# Patient Record
Sex: MALE | Race: WHITE | HISPANIC OR LATINO | Employment: OTHER | URBAN - METROPOLITAN AREA
[De-identification: names, ages, dates, MRNs, and addresses within clinical notes are randomized per-mention and may not be internally consistent; named-entity substitution may affect disease eponyms.]

---

## 2024-01-11 ENCOUNTER — OFFICE VISIT (OUTPATIENT)
Dept: FAMILY MEDICINE CLINIC | Facility: CLINIC | Age: 34
End: 2024-01-11
Payer: COMMERCIAL

## 2024-01-11 VITALS
DIASTOLIC BLOOD PRESSURE: 72 MMHG | BODY MASS INDEX: 29.96 KG/M2 | OXYGEN SATURATION: 97 % | HEIGHT: 71 IN | SYSTOLIC BLOOD PRESSURE: 122 MMHG | WEIGHT: 214 LBS | HEART RATE: 77 BPM | RESPIRATION RATE: 18 BRPM

## 2024-01-11 DIAGNOSIS — Z11.59 NEED FOR HEPATITIS C SCREENING TEST: ICD-10-CM

## 2024-01-11 DIAGNOSIS — Z13.228 ENCOUNTER FOR SCREENING FOR OTHER METABOLIC DISORDERS: ICD-10-CM

## 2024-01-11 DIAGNOSIS — Z13.220 SCREENING FOR HYPERLIPIDEMIA: ICD-10-CM

## 2024-01-11 DIAGNOSIS — L05.91 PILONIDAL CYST: ICD-10-CM

## 2024-01-11 DIAGNOSIS — Z00.00 ANNUAL PHYSICAL EXAM: Primary | ICD-10-CM

## 2024-01-11 LAB
ALBUMIN SERPL-MCNC: 4.2 G/DL (ref 3.5–5.2)
ALBUMIN/GLOB SERPL: 1.6 G/DL
ALP SERPL-CCNC: 57 U/L (ref 39–117)
ALT SERPL-CCNC: 31 U/L (ref 1–41)
AST SERPL-CCNC: 25 U/L (ref 1–40)
BASOPHILS # BLD AUTO: 0.03 10*3/MM3 (ref 0–0.2)
BASOPHILS NFR BLD AUTO: 0.3 % (ref 0–1.5)
BILIRUB SERPL-MCNC: 0.3 MG/DL (ref 0–1.2)
BUN SERPL-MCNC: 17 MG/DL (ref 6–20)
BUN/CREAT SERPL: 15.5 (ref 7–25)
CALCIUM SERPL-MCNC: 9.5 MG/DL (ref 8.6–10.5)
CHLORIDE SERPL-SCNC: 105 MMOL/L (ref 98–107)
CHOLEST SERPL-MCNC: 178 MG/DL (ref 0–200)
CHOLEST/HDLC SERPL: 3.56 {RATIO}
CO2 SERPL-SCNC: 27 MMOL/L (ref 22–29)
CREAT SERPL-MCNC: 1.1 MG/DL (ref 0.76–1.27)
EGFRCR SERPLBLD CKD-EPI 2021: 90.9 ML/MIN/1.73
EOSINOPHIL # BLD AUTO: 0.21 10*3/MM3 (ref 0–0.4)
EOSINOPHIL NFR BLD AUTO: 2.3 % (ref 0.3–6.2)
ERYTHROCYTE [DISTWIDTH] IN BLOOD BY AUTOMATED COUNT: 12.5 % (ref 12.3–15.4)
GLOBULIN SER CALC-MCNC: 2.6 GM/DL
GLUCOSE SERPL-MCNC: 101 MG/DL (ref 65–99)
HCT VFR BLD AUTO: 45.8 % (ref 37.5–51)
HDLC SERPL-MCNC: 50 MG/DL (ref 40–60)
HGB BLD-MCNC: 14.8 G/DL (ref 13–17.7)
IMM GRANULOCYTES # BLD AUTO: 0.05 10*3/MM3 (ref 0–0.05)
IMM GRANULOCYTES NFR BLD AUTO: 0.5 % (ref 0–0.5)
LDLC SERPL CALC-MCNC: 111 MG/DL (ref 0–100)
LYMPHOCYTES # BLD AUTO: 2.8 10*3/MM3 (ref 0.7–3.1)
LYMPHOCYTES NFR BLD AUTO: 30.6 % (ref 19.6–45.3)
MCH RBC QN AUTO: 27.3 PG (ref 26.6–33)
MCHC RBC AUTO-ENTMCNC: 32.3 G/DL (ref 31.5–35.7)
MCV RBC AUTO: 84.5 FL (ref 79–97)
MONOCYTES # BLD AUTO: 0.85 10*3/MM3 (ref 0.1–0.9)
MONOCYTES NFR BLD AUTO: 9.3 % (ref 5–12)
NEUTROPHILS # BLD AUTO: 5.2 10*3/MM3 (ref 1.7–7)
NEUTROPHILS NFR BLD AUTO: 57 % (ref 42.7–76)
NRBC BLD AUTO-RTO: 0 /100 WBC (ref 0–0.2)
PLATELET # BLD AUTO: 254 10*3/MM3 (ref 140–450)
POTASSIUM SERPL-SCNC: 4.5 MMOL/L (ref 3.5–5.2)
PROT SERPL-MCNC: 6.8 G/DL (ref 6–8.5)
RBC # BLD AUTO: 5.42 10*6/MM3 (ref 4.14–5.8)
SODIUM SERPL-SCNC: 141 MMOL/L (ref 136–145)
TRIGL SERPL-MCNC: 93 MG/DL (ref 0–150)
VLDLC SERPL CALC-MCNC: 17 MG/DL (ref 5–40)
WBC # BLD AUTO: 9.14 10*3/MM3 (ref 3.4–10.8)

## 2024-01-11 NOTE — PATIENT INSTRUCTIONS
I will call call or send [employer] message with your lab results.   Please call with any questions or concerns.    Return in about 1 year (around 1/11/2025) for Annual, Labs.

## 2024-01-11 NOTE — PROGRESS NOTES
Preventive Exam    History of Present Illness: Max Swan is a 33 y.o. here for check up and review of routine health maintenance. he states he is doing well and has the below concerns:     Patient has pilonidal cyst that recurred about 1 week ago. He reports that he had surgery on this about 10 years ago. States that site is painful and last night erupted with drainage that had pus and blood. He denies any fever     Past medical history, surgical history and family history have been reviewed.     Review of Systems   Constitutional:  Negative for appetite change, chills, fatigue, fever, unexpected weight gain and unexpected weight loss.   HENT:  Negative for congestion, dental problem, hearing loss, mouth sores, postnasal drip, sinus pressure and sore throat.         Dental exam is due.    Eyes: Negative.  Negative for blurred vision, double vision, photophobia and visual disturbance.        Eye exam is due.    Respiratory:  Negative for cough, chest tightness, shortness of breath and wheezing.    Cardiovascular:  Negative for chest pain, palpitations and leg swelling.   Gastrointestinal:  Negative for abdominal pain, constipation, diarrhea, nausea and vomiting.   Endocrine: Negative.  Negative for cold intolerance and heat intolerance.   Genitourinary: Negative.  Negative for decreased urine volume, difficulty urinating, discharge, dysuria, frequency, penile pain, penile swelling, scrotal swelling and urgency.   Musculoskeletal:  Negative for arthralgias, back pain, joint swelling and myalgias.   Skin:  Positive for dry skin.        Fungal infection of finger nails and toe nails   Allergic/Immunologic: Negative.  Negative for environmental allergies and food allergies.   Neurological:  Negative for dizziness, weakness, numbness and headache.   Hematological: Negative.  Does not bruise/bleed easily.   Psychiatric/Behavioral: Negative.  Negative for dysphoric mood, sleep disturbance, suicidal ideas and depressed  mood. The patient is not nervous/anxious.        PHYSICAL EXAM    Vitals:    01/11/24 0819   BP: 122/72   Pulse: 77   Resp: 18   SpO2: 97%       Body mass index is 29.85 kg/m².   BMI is >= 25 and <30. (Overweight) The following options were offered after discussion;: exercise counseling/recommendations and nutrition counseling/recommendations       Physical Exam  Vitals and nursing note reviewed.   Constitutional:       Appearance: Normal appearance. He is well-developed and overweight.   HENT:      Head: Normocephalic and atraumatic.      Right Ear: Tympanic membrane, ear canal and external ear normal.      Left Ear: Tympanic membrane, ear canal and external ear normal.      Nose: Nose normal.      Mouth/Throat:      Lips: Pink.      Mouth: Mucous membranes are moist.      Tongue: No lesions.      Palate: No mass and lesions.      Pharynx: Oropharynx is clear. Uvula midline.      Tonsils: No tonsillar exudate.   Eyes:      Conjunctiva/sclera: Conjunctivae normal.      Pupils: Pupils are equal, round, and reactive to light.   Neck:      Thyroid: No thyromegaly.   Cardiovascular:      Rate and Rhythm: Normal rate and regular rhythm.      Pulses: Normal pulses.           Dorsalis pedis pulses are 2+ on the right side and 2+ on the left side.        Posterior tibial pulses are 2+ on the right side and 2+ on the left side.      Heart sounds: Normal heart sounds. No murmur heard.  Pulmonary:      Effort: Pulmonary effort is normal.      Breath sounds: Normal breath sounds.   Abdominal:      General: Bowel sounds are normal. There is no distension.      Palpations: Abdomen is soft.      Tenderness: There is no abdominal tenderness.   Musculoskeletal:         General: No deformity. Normal range of motion.      Cervical back: Normal range of motion and neck supple.      Right lower leg: No edema.      Left lower leg: No edema.   Lymphadenopathy:      Head:      Right side of head: No submental, submandibular, tonsillar,  preauricular, posterior auricular or occipital adenopathy.      Left side of head: No submental, submandibular, tonsillar, preauricular, posterior auricular or occipital adenopathy.      Cervical: No cervical adenopathy.      Right cervical: No superficial, deep or posterior cervical adenopathy.     Left cervical: No superficial, deep or posterior cervical adenopathy.      Upper Body:      Right upper body: No supraclavicular adenopathy.      Left upper body: No supraclavicular adenopathy.   Skin:     General: Skin is warm and dry.      Capillary Refill: Capillary refill takes 2 to 3 seconds.             Comments: Small pencil eraser size open wound with blood drainage noted.    Neurological:      General: No focal deficit present.      Mental Status: He is alert and oriented to person, place, and time.      Cranial Nerves: No cranial nerve deficit.      Sensory: Sensation is intact.      Motor: Motor function is intact.      Coordination: Coordination is intact.      Gait: Gait is intact.   Psychiatric:         Attention and Perception: Attention and perception normal.         Mood and Affect: Mood and affect normal.         Speech: Speech normal.         Behavior: Behavior normal. Behavior is cooperative.         Thought Content: Thought content normal.         Cognition and Memory: Cognition and memory normal.         Judgment: Judgment normal.         Procedures    Diagnoses and all orders for this visit:    1. Annual physical exam (Primary)    2. Encounter for screening for other metabolic disorders  -     CBC & Differential  -     Comprehensive Metabolic Panel    3. Screening for hyperlipidemia  -     Lipid Panel With / Chol / HDL Ratio    4. Pilonidal cyst  -     Ambulatory Referral to General Surgery    5. Need for hepatitis C screening test  -     Hepatitis C Antibody        Problems Addressed this Visit    None  Visit Diagnoses       Annual physical exam    -  Primary    Encounter for screening for other  metabolic disorders        Relevant Orders    CBC & Differential    Comprehensive Metabolic Panel    Screening for hyperlipidemia        Relevant Orders    Lipid Panel With / Chol / HDL Ratio    Pilonidal cyst        Relevant Orders    Ambulatory Referral to General Surgery    Need for hepatitis C screening test        Relevant Orders    Hepatitis C Antibody          Diagnoses         Codes Comments    Annual physical exam    -  Primary ICD-10-CM: Z00.00  ICD-9-CM: V70.0     Encounter for screening for other metabolic disorders     ICD-10-CM: Z13.228  ICD-9-CM: V77.99     Screening for hyperlipidemia     ICD-10-CM: Z13.220  ICD-9-CM: V77.91     Pilonidal cyst     ICD-10-CM: L05.91  ICD-9-CM: 685.1     Need for hepatitis C screening test     ICD-10-CM: Z11.59  ICD-9-CM: V73.89           Lipid panel  CMP  CBC  Hepatitis C Screening  Referral to general surgery    Routine health maintenance reviewed and discussed with Max Swan.    Preventative counseling regarding healthy diet and exercise.   Pt reports that he wears a seatbelt regularly.   Return in about 1 year (around 1/11/2025) for Annual, Labs.

## 2024-01-12 LAB — HCV IGG SERPL QL IA: NON REACTIVE

## 2024-02-26 ENCOUNTER — OFFICE VISIT (OUTPATIENT)
Dept: SURGERY | Facility: CLINIC | Age: 34
End: 2024-02-26
Payer: COMMERCIAL

## 2024-02-26 VITALS
HEIGHT: 71 IN | SYSTOLIC BLOOD PRESSURE: 116 MMHG | BODY MASS INDEX: 29.26 KG/M2 | DIASTOLIC BLOOD PRESSURE: 76 MMHG | WEIGHT: 209 LBS

## 2024-02-26 DIAGNOSIS — L05.91 PILONIDAL CYST: Primary | ICD-10-CM

## 2024-02-26 PROCEDURE — 99203 OFFICE O/P NEW LOW 30 MIN: CPT | Performed by: SURGERY

## 2024-02-26 RX ORDER — AMOXICILLIN AND CLAVULANATE POTASSIUM 500; 125 MG/1; MG/1
1 TABLET, FILM COATED ORAL 3 TIMES DAILY
Qty: 10 TABLET | Refills: 0 | Status: SHIPPED | OUTPATIENT
Start: 2024-02-26

## 2024-02-26 NOTE — PROGRESS NOTES
Cc: Pilonidal cyst    History of presenting illness:   This is a quite nice, generally healthy 33-year-old gentleman with a history of pilonidal cyst going back to his late teen years.  He did not have much trouble with it until 10 years ago and at that time he had what sounds like an incision and drainage.  Really in the interim over the last 10 years she has had minimal trouble from this until about a month or so ago when he began having some swelling, eventually came to what sound like a point and then began to drain spontaneously just above into the left of the superior gluteal cleft.  He still feels some sense of pressure but overall it is improved.  There is minimal drainage currently.    Past Medical History: Denies    Past Surgical History: Significant only for what sounds like incision and drainage of a pilonidal cyst    Medications: None chronically    Allergies: Aspirin caused swelling    Social History: He is a non-smoker, he is active    Family History: Significant for diabetes, negative for known colon or rectal cancer    Review of Systems:  Constitutional: Denies fever, chills, change in weight  Neck: no swollen glands or dysphagia or odynophagia  Respiratory: negative for SOB, cough, hemoptysis or wheezing  Cardiovascular: negative for chest pain, palpitations or peripheral edema  Gastrointestinal: Denies nausea, vomiting, abdominal pain      Physical Exam:  BMI: 29.2  General: alert and oriented, appropriate, no acute distress  Eyes: No scleral icterus, extraocular movements are intact  Neck: Supple without lymphadenopathy or thyromegaly, trachea is in the midline  Respiratory: There is good bilateral chest expansion, no use of accessory muscles is noted  Cardiovascular: No jugular venous distention or peripheral edema is seen  Gastrointestinal: Soft and benign without mass or ventral hernia  Rectal: At the superior gluteal cleft, just to the left of this there is a firm area consistent with  pilonidal cyst.  Inferiorly along the midline there are several sinus openings.  There is mild tenderness, no clear fluctuance.  There is a small amount of drainage.    Laboratory data: None    Imaging data: No recent relevant data      Assessment and plan:   -Pilonidal cyst, mild symptoms, really for the first time in about 10 years  -I discussed the options with the patient.  I think given his age (greater than 30) and the relative infrequency of his flareups, I would suggest conservative management for now.  I do think that a trial of antibiotics in order to try to get some of the swelling down would be wise.  Going forward if he has further problems we could treat him with antibiotics and obviously pilonidal cystectomy could be considered, but I think all things equal would be best served with conservative manage for the time being.  He is agreeable with this and will contact us if he is having further problems.      Donell Merritt MD, FACS  General, Minimally Invasive and Endoscopic Surgery  [facility]    [address], [address]  [place], [place], [address]  P: [phone]  F: [phone]

## 2024-04-11 ENCOUNTER — OFFICE VISIT (OUTPATIENT)
Dept: FAMILY MEDICINE CLINIC | Facility: CLINIC | Age: 34
End: 2024-04-11
Payer: COMMERCIAL

## 2024-04-11 VITALS
BODY MASS INDEX: 28.7 KG/M2 | RESPIRATION RATE: 18 BRPM | WEIGHT: 205 LBS | DIASTOLIC BLOOD PRESSURE: 88 MMHG | SYSTOLIC BLOOD PRESSURE: 126 MMHG | HEART RATE: 80 BPM | HEIGHT: 71 IN | OXYGEN SATURATION: 98 %

## 2024-04-11 DIAGNOSIS — F41.9 ANXIETY: ICD-10-CM

## 2024-04-11 DIAGNOSIS — R07.9 CHEST PAIN, UNSPECIFIED TYPE: ICD-10-CM

## 2024-04-11 DIAGNOSIS — Z23 NEED FOR VACCINATION: Primary | ICD-10-CM

## 2024-04-11 NOTE — PROGRESS NOTES
"Samantha Swan is a 33 y.o. male.     History of Present Illness   Patient presents with c/o chest pain X 5 days, in the middle of his chest below collarbone. He reports that the pain is intermittent and feels like when \"someone scares you\".  He reports that he's a little anxious at times.  He denies any headaches, palpitations or shortness of breath. Negative for family history of cardiac disease.     The following portions of the patient's history were reviewed and updated as appropriate: allergies, current medications, past family history, past medical history, past social history, past surgical history and problem list.    Review of Systems   Constitutional:  Negative for chills, fatigue and fever.   Eyes:  Negative for blurred vision and double vision.   Respiratory:  Negative for cough, chest tightness, shortness of breath and wheezing.    Cardiovascular:  Negative for chest pain, palpitations and leg swelling.   Neurological:  Negative for dizziness, weakness and headache.       Objective   Physical Exam  Vitals and nursing note reviewed.   Constitutional:       Appearance: He is well-developed.   HENT:      Head: Normocephalic and atraumatic.   Eyes:      Conjunctiva/sclera: Conjunctivae normal.      Pupils: Pupils are equal, round, and reactive to light.   Neck:      Thyroid: No thyromegaly.   Cardiovascular:      Rate and Rhythm: Normal rate and regular rhythm.      Pulses: Normal pulses.      Heart sounds: Normal heart sounds. No murmur heard.     No friction rub. No gallop.      Comments: Normal cardiac exam  Pulmonary:      Effort: Pulmonary effort is normal.      Breath sounds: Normal breath sounds.   Musculoskeletal:      Cervical back: Normal range of motion and neck supple.      Right lower leg: No edema.      Left lower leg: No edema.   Lymphadenopathy:      Cervical: No cervical adenopathy.   Skin:     General: Skin is warm and dry.      Capillary Refill: Capillary refill takes 2 to 3 " seconds.   Neurological:      Mental Status: He is alert and oriented to person, place, and time.      Cranial Nerves: No cranial nerve deficit.   Psychiatric:         Behavior: Behavior normal.         Thought Content: Thought content normal.         Judgment: Judgment normal.         Vitals:    04/11/24 1129   BP: 126/88   Pulse: 80   Resp: 18   SpO2: 98%     Body mass index is 28.59 kg/m².        ECG 12 Lead    Date/Time: 4/11/2024 1:19 PM  Performed by: Sirisha Light APRN    Authorized by: Sirisha Light APRN  Comparison: not compared with previous ECG   Previous ECG: no previous ECG available  Rhythm: sinus rhythm  Rate: normal  BPM: 70    Clinical impression: normal ECG          Assessment & Plan   Problems Addressed this Visit    None  Visit Diagnoses       Need for vaccination    -  Primary    Relevant Orders    COVID-19 F23 (Pfizer) 12yrs+ (COMIRNATY) (Completed)    Tdap Vaccine => 8yo IM (BOOSTRIX) (Completed)    Chest pain, unspecified type        Relevant Orders    ECG 12 Lead    Comprehensive Metabolic Panel    CBC (No Diff)    Anxiety              Diagnoses         Codes Comments    Need for vaccination    -  Primary ICD-10-CM: Z23  ICD-9-CM: V05.9     Chest pain, unspecified type     ICD-10-CM: R07.9  ICD-9-CM: 786.50     Anxiety     ICD-10-CM: F41.9  ICD-9-CM: 300.00           EKG was normal  Discussed anxiety and encouraged exercise  Patient education regarding anxiety included in AVS  CBC  CMP           Return if symptoms worsen or fail to improve.  Answers submitted by the patient for this visit:  Primary Reason for Visit (Submitted on 4/10/2024)  What is the primary reason for your visit?: Other  Other (Submitted on 4/10/2024)  Please describe your symptoms.: Chest Pain  Have you had these symptoms before?: No  How long have you been having these symptoms?: 1-4 days  Please list any medications you are currently taking for this condition.: None  Please describe any probable cause for  these symptoms. : Stress/Anxiety possibly

## 2024-04-12 LAB
ALBUMIN SERPL-MCNC: 4.4 G/DL (ref 3.5–5.2)
ALBUMIN/GLOB SERPL: 1.6 G/DL
ALP SERPL-CCNC: 57 U/L (ref 39–117)
ALT SERPL-CCNC: 50 U/L (ref 1–41)
AST SERPL-CCNC: 22 U/L (ref 1–40)
BILIRUB SERPL-MCNC: 0.4 MG/DL (ref 0–1.2)
BUN SERPL-MCNC: 15 MG/DL (ref 6–20)
BUN/CREAT SERPL: 11.5 (ref 7–25)
CALCIUM SERPL-MCNC: 9.5 MG/DL (ref 8.6–10.5)
CHLORIDE SERPL-SCNC: 104 MMOL/L (ref 98–107)
CO2 SERPL-SCNC: 26.3 MMOL/L (ref 22–29)
CREAT SERPL-MCNC: 1.31 MG/DL (ref 0.76–1.27)
EGFRCR SERPLBLD CKD-EPI 2021: 73.7 ML/MIN/1.73
ERYTHROCYTE [DISTWIDTH] IN BLOOD BY AUTOMATED COUNT: 13 % (ref 12.3–15.4)
GLOBULIN SER CALC-MCNC: 2.8 GM/DL
GLUCOSE SERPL-MCNC: 111 MG/DL (ref 65–99)
HCT VFR BLD AUTO: 46.5 % (ref 37.5–51)
HGB BLD-MCNC: 15.2 G/DL (ref 13–17.7)
MCH RBC QN AUTO: 27.5 PG (ref 26.6–33)
MCHC RBC AUTO-ENTMCNC: 32.7 G/DL (ref 31.5–35.7)
MCV RBC AUTO: 84.1 FL (ref 79–97)
PLATELET # BLD AUTO: 253 10*3/MM3 (ref 140–450)
POTASSIUM SERPL-SCNC: 4.7 MMOL/L (ref 3.5–5.2)
PROT SERPL-MCNC: 7.2 G/DL (ref 6–8.5)
RBC # BLD AUTO: 5.53 10*6/MM3 (ref 4.14–5.8)
SODIUM SERPL-SCNC: 140 MMOL/L (ref 136–145)
WBC # BLD AUTO: 6.19 10*3/MM3 (ref 3.4–10.8)

## 2025-01-13 ENCOUNTER — OFFICE VISIT (OUTPATIENT)
Dept: FAMILY MEDICINE CLINIC | Facility: CLINIC | Age: 35
End: 2025-01-13
Payer: COMMERCIAL

## 2025-01-13 VITALS
DIASTOLIC BLOOD PRESSURE: 92 MMHG | HEIGHT: 71 IN | WEIGHT: 226 LBS | OXYGEN SATURATION: 99 % | SYSTOLIC BLOOD PRESSURE: 124 MMHG | HEART RATE: 82 BPM | RESPIRATION RATE: 18 BRPM | BODY MASS INDEX: 31.64 KG/M2

## 2025-01-13 DIAGNOSIS — Z13.220 SCREENING FOR HYPERLIPIDEMIA: ICD-10-CM

## 2025-01-13 DIAGNOSIS — Z13.228 ENCOUNTER FOR SCREENING FOR OTHER METABOLIC DISORDERS: ICD-10-CM

## 2025-01-13 DIAGNOSIS — Z00.00 ANNUAL PHYSICAL EXAM: Primary | ICD-10-CM

## 2025-01-13 DIAGNOSIS — L85.3 DRY SKIN: ICD-10-CM

## 2025-01-13 LAB
BASOPHILS # BLD AUTO: 0.05 10*3/MM3 (ref 0–0.2)
BASOPHILS NFR BLD AUTO: 0.5 % (ref 0–1.5)
EOSINOPHIL # BLD AUTO: 0.25 10*3/MM3 (ref 0–0.4)
EOSINOPHIL NFR BLD AUTO: 2.7 % (ref 0.3–6.2)
ERYTHROCYTE [DISTWIDTH] IN BLOOD BY AUTOMATED COUNT: 12.9 % (ref 12.3–15.4)
HCT VFR BLD AUTO: 45.3 % (ref 37.5–51)
HGB BLD-MCNC: 15.5 G/DL (ref 13–17.7)
IMM GRANULOCYTES # BLD AUTO: 0.07 10*3/MM3 (ref 0–0.05)
IMM GRANULOCYTES NFR BLD AUTO: 0.8 % (ref 0–0.5)
LYMPHOCYTES # BLD AUTO: 3.03 10*3/MM3 (ref 0.7–3.1)
LYMPHOCYTES NFR BLD AUTO: 33.1 % (ref 19.6–45.3)
MCH RBC QN AUTO: 29.1 PG (ref 26.6–33)
MCHC RBC AUTO-ENTMCNC: 34.2 G/DL (ref 31.5–35.7)
MCV RBC AUTO: 85.2 FL (ref 79–97)
MONOCYTES # BLD AUTO: 0.88 10*3/MM3 (ref 0.1–0.9)
MONOCYTES NFR BLD AUTO: 9.6 % (ref 5–12)
NEUTROPHILS # BLD AUTO: 4.87 10*3/MM3 (ref 1.7–7)
NEUTROPHILS NFR BLD AUTO: 53.3 % (ref 42.7–76)
NRBC BLD AUTO-RTO: 0 /100 WBC (ref 0–0.2)
PLATELET # BLD AUTO: 254 10*3/MM3 (ref 140–450)
RBC # BLD AUTO: 5.32 10*6/MM3 (ref 4.14–5.8)
WBC # BLD AUTO: 9.15 10*3/MM3 (ref 3.4–10.8)

## 2025-01-13 PROCEDURE — 99395 PREV VISIT EST AGE 18-39: CPT | Performed by: NURSE PRACTITIONER

## 2025-01-13 NOTE — PATIENT INSTRUCTIONS
Return in about 1 year (around 1/13/2026) for Annual, Labs.  Call with any questions or concerns.

## 2025-01-13 NOTE — PROGRESS NOTES
Preventive Exam    History of Present Illness: Max Swan is a 34 y.o. here for check up and review of routine health maintenance. he states he is doing well and has no concerns.    Past medical history, surgical history and family history have been reviewed.     History of Present Illness  The patient presents for a physical exam.    He reports no significant changes in her medical history since his last visit. He does not experience any recent alterations in appetite, fever, chills, nasal congestion, or rhinorrhea. He has not undergone a dental examination recently. He does not report any visual disturbances such as blurred or double vision, photophobia, chest tightness, cough, chest pain, lower extremity edema, palpitations, abdominal pain, constipation, diarrhea, nausea, vomiting, urinary issues including dysuria, frequency, hesitancy, or nocturia. He also does not experience any musculoskeletal pain, dermatological changes, dizziness, headaches, abnormal bruising or bleeding, or symptoms of depression or anxiety. His sleep pattern is normal. He reports that he has  received his COVID-19 and influenza vaccines approximately one year ago.    He  experiences dry skin during the winter months, which she manages with tea tree oil and lotions. However, he notes that his skin condition deteriorates if he neglects this regimen for three days. Despite this, he reports an improvement in his skin condition compared to the previous year.    ALLERGIES  The patient is allergic to ASPIRIN.          Review of Systems   Constitutional:  Negative for appetite change, chills, fatigue and fever.   HENT:  Negative for congestion, dental problem, hearing loss, sinus pressure and sore throat.         Dental exam is due.    Eyes: Negative.  Negative for blurred vision, double vision, photophobia and visual disturbance.        Eye exam is due.    Respiratory:  Negative for cough, chest tightness, shortness of breath and wheezing.     Cardiovascular:  Negative for chest pain, palpitations and leg swelling.   Gastrointestinal:  Negative for abdominal pain, constipation, diarrhea, nausea and vomiting.   Endocrine: Negative.  Negative for cold intolerance and heat intolerance.   Genitourinary: Negative.  Negative for decreased libido, difficulty urinating, discharge, flank pain, hematuria, nocturia, penile swelling, testicular pain and urgency.   Musculoskeletal:  Negative for arthralgias, back pain, joint swelling and myalgias.   Skin:  Positive for dry skin. Negative for color change, rash and skin lesions.   Allergic/Immunologic: Negative.  Negative for environmental allergies and food allergies.   Neurological:  Negative for dizziness, syncope, weakness, numbness and headache.   Hematological: Negative.  Does not bruise/bleed easily.   Psychiatric/Behavioral: Negative.  Negative for sleep disturbance, suicidal ideas and depressed mood. The patient is not nervous/anxious.        PHYSICAL EXAM    Vitals:    01/13/25 0959   BP: 124/92   Pulse: 82   Resp: 18   SpO2: 99%       Body mass index is 31.52 kg/m².   BMI is >= 30 and <35. (Class 1 Obesity). The following options were offered after discussion;: weight loss educational material (shared in after visit summary), exercise counseling/recommendations, nutrition counseling/recommendations, and pharmacological intervention options       Physical Exam  Vitals and nursing note reviewed.   Constitutional:       Appearance: Normal appearance. He is well-developed and normal weight.   HENT:      Head: Normocephalic and atraumatic.      Right Ear: Tympanic membrane, ear canal and external ear normal.      Left Ear: Tympanic membrane, ear canal and external ear normal.      Nose: Nose normal.      Mouth/Throat:      Lips: Pink.      Mouth: Mucous membranes are moist.      Tongue: No lesions.      Palate: No mass and lesions.      Pharynx: Oropharynx is clear. Uvula midline.      Tonsils: No tonsillar  exudate.   Eyes:      Conjunctiva/sclera: Conjunctivae normal.      Pupils: Pupils are equal, round, and reactive to light.   Neck:      Thyroid: No thyromegaly.   Cardiovascular:      Rate and Rhythm: Normal rate and regular rhythm.      Pulses: Normal pulses.           Dorsalis pedis pulses are 2+ on the right side and 2+ on the left side.        Posterior tibial pulses are 2+ on the right side and 2+ on the left side.      Heart sounds: Normal heart sounds. No murmur heard.  Pulmonary:      Effort: Pulmonary effort is normal.      Breath sounds: Normal breath sounds.   Abdominal:      General: Bowel sounds are normal. There is no distension.      Palpations: Abdomen is soft.      Tenderness: There is no abdominal tenderness.   Musculoskeletal:         General: No deformity. Normal range of motion.      Cervical back: Normal range of motion and neck supple.      Right lower leg: No edema.      Left lower leg: No edema.   Lymphadenopathy:      Head:      Right side of head: No submental, submandibular, tonsillar, preauricular, posterior auricular or occipital adenopathy.      Left side of head: No submental, submandibular, tonsillar, preauricular, posterior auricular or occipital adenopathy.      Cervical: No cervical adenopathy.      Right cervical: No superficial, deep or posterior cervical adenopathy.     Left cervical: No superficial, deep or posterior cervical adenopathy.      Upper Body:      Right upper body: No supraclavicular adenopathy.      Left upper body: No supraclavicular adenopathy.   Skin:     General: Skin is warm and dry.      Capillary Refill: Capillary refill takes 2 to 3 seconds.   Neurological:      General: No focal deficit present.      Mental Status: He is alert and oriented to person, place, and time.      Cranial Nerves: No cranial nerve deficit.      Sensory: Sensation is intact.      Motor: Motor function is intact.      Coordination: Coordination is intact.      Gait: Gait is intact.    Psychiatric:         Attention and Perception: Attention and perception normal.         Mood and Affect: Mood and affect normal.         Speech: Speech normal.         Behavior: Behavior normal. Behavior is cooperative.         Thought Content: Thought content normal.         Cognition and Memory: Cognition and memory normal.         Judgment: Judgment normal.         Results         Procedures    Diagnoses and all orders for this visit:    1. Annual physical exam (Primary)    2. Encounter for screening for other metabolic disorders  -     CBC & Differential  -     Comprehensive Metabolic Panel    3. Screening for hyperlipidemia  -     Lipid Panel With / Chol / HDL Ratio    4. Dry skin           1. Annual physical examination.  His blood pressure readings are within the normal range today. He reports no changes in his medical history since the last visit. No recent changes in appetite, fever, chills, nasal congestion, or runny nose. No issues with vision, chest tightness, cough, chest pain, leg swelling, heart racing, abdominal pain, constipation, diarrhea, nausea, vomiting, urinary problems, joint or muscle pain, or skin changes except for dry patches in winter. No dizziness, headaches, bruising, bleeding, depression, or anxiety. He has not had a dental or eye exam in the past year. Laboratory tests will be conducted today, and she will be informed of the results promptly.    2. Seasonal dry skin.  He experiences dry skin during the winter months, which he manages with tea tree oil and lotions. However, he notes that his skin condition deteriorates if he neglects this regimen for three days. Despite this, he reports an improvement in his skin condition compared to the previous year.       Problems Addressed this Visit    None  Visit Diagnoses       Annual physical exam    -  Primary    Encounter for screening for other metabolic disorders        Relevant Orders    CBC & Differential    Comprehensive Metabolic  Panel    Screening for hyperlipidemia        Relevant Orders    Lipid Panel With / Chol / HDL Ratio    Dry skin              Diagnoses         Codes Comments    Annual physical exam    -  Primary ICD-10-CM: Z00.00  ICD-9-CM: V70.0     Encounter for screening for other metabolic disorders     ICD-10-CM: Z13.228  ICD-9-CM: V77.99     Screening for hyperlipidemia     ICD-10-CM: Z13.220  ICD-9-CM: V77.91     Dry skin     ICD-10-CM: L85.3  ICD-9-CM: 701.1             Routine health maintenance reviewed and discussed with Max Swan.    Preventative counseling regarding healthy diet and exercise.   Pt reports that he wears a seatbelt regularly.     Patient or patient representative verbalized consent for the use of [employer]ient List[employer] during the visit with  ROOSEVELT Abrams for chart documentation. 1/13/2025  10:10 EST    Return in about 1 year (around 1/13/2026) for Annual, Labs.

## 2025-01-14 LAB
ALBUMIN SERPL-MCNC: 4.3 G/DL (ref 3.5–5.2)
ALBUMIN/GLOB SERPL: 1.4 G/DL
ALP SERPL-CCNC: 57 U/L (ref 39–117)
ALT SERPL-CCNC: 29 U/L (ref 1–41)
AST SERPL-CCNC: 26 U/L (ref 1–40)
BILIRUB SERPL-MCNC: 0.2 MG/DL (ref 0–1.2)
BUN SERPL-MCNC: 16 MG/DL (ref 6–20)
BUN/CREAT SERPL: 15 (ref 7–25)
CALCIUM SERPL-MCNC: 9.4 MG/DL (ref 8.6–10.5)
CHLORIDE SERPL-SCNC: 104 MMOL/L (ref 98–107)
CHOLEST SERPL-MCNC: 199 MG/DL (ref 0–200)
CHOLEST/HDLC SERPL: 4.63 {RATIO}
CO2 SERPL-SCNC: 27.5 MMOL/L (ref 22–29)
CREAT SERPL-MCNC: 1.07 MG/DL (ref 0.76–1.27)
EGFRCR SERPLBLD CKD-EPI 2021: 93.4 ML/MIN/1.73
GLOBULIN SER CALC-MCNC: 3.1 GM/DL
GLUCOSE SERPL-MCNC: 118 MG/DL (ref 65–99)
HDLC SERPL-MCNC: 43 MG/DL (ref 40–60)
LDLC SERPL CALC-MCNC: 142 MG/DL (ref 0–100)
POTASSIUM SERPL-SCNC: 4.6 MMOL/L (ref 3.5–5.2)
PROT SERPL-MCNC: 7.4 G/DL (ref 6–8.5)
SODIUM SERPL-SCNC: 139 MMOL/L (ref 136–145)
TRIGL SERPL-MCNC: 78 MG/DL (ref 0–150)
VLDLC SERPL CALC-MCNC: 14 MG/DL (ref 5–40)